# Patient Record
Sex: FEMALE | URBAN - METROPOLITAN AREA
[De-identification: names, ages, dates, MRNs, and addresses within clinical notes are randomized per-mention and may not be internally consistent; named-entity substitution may affect disease eponyms.]

---

## 2020-09-24 DIAGNOSIS — I63.9 CEREBROVASCULAR ACCIDENT (CVA), UNSPECIFIED MECHANISM: ICD-10-CM

## 2020-09-24 DIAGNOSIS — Z36.89 ENCOUNTER FOR FETAL ANATOMIC SURVEY: Primary | ICD-10-CM

## 2020-10-13 ENCOUNTER — TELEPHONE (OUTPATIENT)
Dept: MATERNAL FETAL MEDICINE | Facility: CLINIC | Age: 35
End: 2020-10-13

## 2020-10-14 ENCOUNTER — OFFICE VISIT (OUTPATIENT)
Dept: MATERNAL FETAL MEDICINE | Facility: CLINIC | Age: 35
End: 2020-10-14
Payer: MEDICAID

## 2020-10-14 VITALS
BODY MASS INDEX: 33.91 KG/M2 | HEIGHT: 61 IN | SYSTOLIC BLOOD PRESSURE: 133 MMHG | DIASTOLIC BLOOD PRESSURE: 63 MMHG | WEIGHT: 179.63 LBS | TEMPERATURE: 98 F

## 2020-10-14 DIAGNOSIS — Z86.79 HISTORY OF NONTRAUMATIC RUPTURE OF CEREBRAL ANEURYSM: ICD-10-CM

## 2020-10-14 DIAGNOSIS — I63.9 CEREBROVASCULAR ACCIDENT (CVA), UNSPECIFIED MECHANISM: ICD-10-CM

## 2020-10-14 DIAGNOSIS — Z36.89 ENCOUNTER FOR FETAL ANATOMIC SURVEY: ICD-10-CM

## 2020-10-14 PROBLEM — N96 HISTORY OF MULTIPLE MISCARRIAGES: Status: ACTIVE | Noted: 2020-10-14

## 2020-10-14 PROBLEM — Z86.73 HISTORY OF STROKE WITHIN LAST YEAR: Status: ACTIVE | Noted: 2020-10-14

## 2020-10-14 PROCEDURE — 76805 PR US, OB 14+WKS, TRANSABD, SINGLE GESTATION: ICD-10-PCS | Mod: ,,, | Performed by: OBSTETRICS & GYNECOLOGY

## 2020-10-14 PROCEDURE — 99203 OFFICE O/P NEW LOW 30 MIN: CPT | Mod: TH,25,, | Performed by: OBSTETRICS & GYNECOLOGY

## 2020-10-14 PROCEDURE — 99203 PR OFFICE/OUTPT VISIT, NEW, LEVL III, 30-44 MIN: ICD-10-PCS | Mod: TH,25,, | Performed by: OBSTETRICS & GYNECOLOGY

## 2020-10-14 PROCEDURE — 76805 OB US >/= 14 WKS SNGL FETUS: CPT | Mod: ,,, | Performed by: OBSTETRICS & GYNECOLOGY

## 2020-10-14 RX ORDER — LEVETIRACETAM 500 MG/1
TABLET ORAL
COMMUNITY
Start: 2020-09-06

## 2020-10-14 RX ORDER — LABETALOL 100 MG/1
100 TABLET, FILM COATED ORAL 3 TIMES DAILY
COMMUNITY
Start: 2020-09-09

## 2020-10-14 RX ORDER — ASPIRIN 81 MG/1
81 TABLET ORAL DAILY
COMMUNITY
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
## 2020-10-14 NOTE — LETTER
October 14, 2020      Gregory Garg MD  [address] [place] [address]           [place] [facility]  [address], [address]  [place] [address]  Phone: [phone]          Patient: Nathaniel Stephens   MR Number: [id number]   YOB: 1985   Date of Visit: 10/14/2020       Dear Dr. Gregory Garg:    Thank you for referring Nathaniel Stephens to me for evaluation. Attached you will find relevant portions of my assessment and plan of care.    If you have questions, please do not hesitate to call me. I look forward to following Nathaniel Stephens along with you.    Sincerely,    Chris Montemayor MD    Enclosure  CC:  No Recipients    If you would like to receive this communication electronically, please contact [email or web address] or [phone] to request more information on [employer] Link access.    For providers and/or their staff who would like to refer a patient to [facility], please contact us through our one-stop-shop provider referral line, [facility], at [phone].    If you feel you have received this communication in error or would no longer like to receive these types of communications, please e-mail [email or web address]

## 2020-11-10 ENCOUNTER — TELEPHONE (OUTPATIENT)
Dept: MATERNAL FETAL MEDICINE | Facility: CLINIC | Age: 35
End: 2020-11-10

## 2020-11-11 ENCOUNTER — PROCEDURE VISIT (OUTPATIENT)
Dept: MATERNAL FETAL MEDICINE | Facility: CLINIC | Age: 35
End: 2020-11-11
Payer: MEDICAID

## 2020-11-11 VITALS
SYSTOLIC BLOOD PRESSURE: 121 MMHG | BODY MASS INDEX: 34.58 KG/M2 | DIASTOLIC BLOOD PRESSURE: 62 MMHG | TEMPERATURE: 98 F | WEIGHT: 183 LBS

## 2020-11-11 DIAGNOSIS — Z86.79 HISTORY OF NONTRAUMATIC RUPTURE OF CEREBRAL ANEURYSM: ICD-10-CM

## 2020-11-11 DIAGNOSIS — Z36.89 ENCOUNTER FOR FETAL ANATOMIC SURVEY: ICD-10-CM

## 2020-11-11 DIAGNOSIS — I63.9 CEREBROVASCULAR ACCIDENT (CVA), UNSPECIFIED MECHANISM: ICD-10-CM

## 2020-11-11 DIAGNOSIS — Z36.89 ENCOUNTER FOR ULTRASOUND TO ASSESS FETAL GROWTH: Primary | ICD-10-CM

## 2020-11-11 PROCEDURE — 76811 PR US, OB FETAL EVAL & EXAM, TRANSABDOM,FIRST GESTATION: ICD-10-PCS | Mod: ,,, | Performed by: OBSTETRICS & GYNECOLOGY

## 2020-11-11 PROCEDURE — 99214 OFFICE O/P EST MOD 30 MIN: CPT | Mod: TH,25,, | Performed by: OBSTETRICS & GYNECOLOGY

## 2020-11-11 PROCEDURE — 99214 PR OFFICE/OUTPT VISIT, EST, LEVL IV, 30-39 MIN: ICD-10-PCS | Mod: TH,25,, | Performed by: OBSTETRICS & GYNECOLOGY

## 2020-11-11 PROCEDURE — 76811 OB US DETAILED SNGL FETUS: CPT | Mod: ,,, | Performed by: OBSTETRICS & GYNECOLOGY

## 2020-11-11 NOTE — PROGRESS NOTES
The patient returns today for her anatomy scan and to review her voluminous prior records concerning her recent stroke.  This apparently was a purely hemorrhagic stroke from which she is recovering nicely.  A full thrombophilia workup done at the time of her last visit with us was negative.  With this in mind I would not change her present management; continue aspirin with no need for Lovenox.  I discussed this with the patient and she understands and agrees to the management protocol.  With the exception of ligament pain she has no new complaints and states she is doing well.  The patient was given an opportunity to ask questions about management and the diease process.  She expressed an understanding of and agreement to the above impression and plan. All questions were answered to her satisfaction.  She was given contact information to the [facility] clinic to address further concerns.      The approximate physician face-to-face time was 30 minutes. The majority of the time (>50%) was spent on counseling of the patient or coordination of care.

## 2020-12-22 ENCOUNTER — TELEPHONE (OUTPATIENT)
Dept: MATERNAL FETAL MEDICINE | Facility: CLINIC | Age: 35
End: 2020-12-22

## 2020-12-23 ENCOUNTER — OFFICE VISIT (OUTPATIENT)
Dept: MATERNAL FETAL MEDICINE | Facility: CLINIC | Age: 35
End: 2020-12-23
Payer: MEDICAID

## 2020-12-23 VITALS
BODY MASS INDEX: 37.6 KG/M2 | DIASTOLIC BLOOD PRESSURE: 76 MMHG | TEMPERATURE: 98 F | WEIGHT: 199 LBS | SYSTOLIC BLOOD PRESSURE: 139 MMHG

## 2020-12-23 DIAGNOSIS — Z36.89 ENCOUNTER FOR ULTRASOUND TO ASSESS FETAL GROWTH: ICD-10-CM

## 2020-12-23 PROCEDURE — 76816 PR  US,PREGNANT UTERUS,F/U,TRANSABD APP: ICD-10-PCS | Mod: ,,, | Performed by: OBSTETRICS & GYNECOLOGY

## 2020-12-23 PROCEDURE — 99213 OFFICE O/P EST LOW 20 MIN: CPT | Mod: TH,25,, | Performed by: OBSTETRICS & GYNECOLOGY

## 2020-12-23 PROCEDURE — 99213 PR OFFICE/OUTPT VISIT, EST, LEVL III, 20-29 MIN: ICD-10-PCS | Mod: TH,25,, | Performed by: OBSTETRICS & GYNECOLOGY

## 2020-12-23 PROCEDURE — 76816 OB US FOLLOW-UP PER FETUS: CPT | Mod: ,,, | Performed by: OBSTETRICS & GYNECOLOGY

## 2020-12-23 RX ORDER — DOCUSATE SODIUM 100 MG/1
100 CAPSULE, LIQUID FILLED ORAL
COMMUNITY
Start: 2020-12-03

## 2020-12-23 RX ORDER — BUTALBITAL, ACETAMINOPHEN AND CAFFEINE 50; 325; 40 MG/1; MG/1; MG/1
1 TABLET ORAL
COMMUNITY
Start: 2020-12-09

## 2020-12-23 RX ORDER — LEVETIRACETAM 500 MG/1
500 TABLET ORAL
COMMUNITY

## 2020-12-23 NOTE — LETTER
December 23, 2020      Gregory Garg MD  [address] [facility] [place] [address]           [place] - [facility]  [address], [address]  [place] [address]  Phone: [phone]          Patient: Nathaniel Stephens   MR Number: [id number]   YOB: 1985   Date of Visit: 12/23/2020       Dear Dr. Gregory Garg:    Thank you for referring Nathaniel Stephens to me for evaluation. Attached you will find relevant portions of my assessment and plan of care.    If you have questions, please do not hesitate to call me. I look forward to following Nathaniel Stephens along with you.    Sincerely,    Chris Montemayor MD    Enclosure  CC:  No Recipients    If you would like to receive this communication electronically, please contact [email or web address] or [phone] to request more information on [employer] Link access.    For providers and/or their staff who would like to refer a patient to [facility], please contact us through our one-stop-shop provider referral line, [facility], at [phone].    If you feel you have received this communication in error or would no longer like to receive these types of communications, please e-mail [email or web address]

## 2020-12-23 NOTE — PROGRESS NOTES
The patient returns today for growth scan.  She was recently seen in the hospital for persistent headaches.  Although we do not have the records, by patient report she had a normal CT scan and was placed on Fioricet as they were diagnosed as migraines.  The Fioricet is helping.  We also noted that several of the biometric indicators were falling off.  She does not qualify for intrauterine growth restriction at this point in time, however, since there is a significant decrease in the slope of the growth curve for these indicators I will see her back in 3 weeks for follow-up on growth.  The patient was given an opportunity to ask questions about management and the diease process.  She expressed an understanding of and agreement to the above impression and plan. All questions were answered to her satisfaction.  She was given contact information to the [facility] clinic to address further concerns.      The approximate physician face-to-face time was 15 minutes. The majority of the time (>50%) was spent on counseling of the patient or coordination of care.